# Patient Record
Sex: MALE | Race: WHITE | NOT HISPANIC OR LATINO | URBAN - METROPOLITAN AREA
[De-identification: names, ages, dates, MRNs, and addresses within clinical notes are randomized per-mention and may not be internally consistent; named-entity substitution may affect disease eponyms.]

---

## 2022-05-07 ENCOUNTER — APPOINTMENT (OUTPATIENT)
Dept: RADIOLOGY | Facility: MEDICAL CENTER | Age: 57
End: 2022-05-07
Attending: EMERGENCY MEDICINE
Payer: COMMERCIAL

## 2022-05-07 ENCOUNTER — HOSPITAL ENCOUNTER (EMERGENCY)
Facility: MEDICAL CENTER | Age: 57
End: 2022-05-07
Attending: EMERGENCY MEDICINE
Payer: COMMERCIAL

## 2022-05-07 VITALS
WEIGHT: 250.88 LBS | DIASTOLIC BLOOD PRESSURE: 82 MMHG | OXYGEN SATURATION: 92 % | BODY MASS INDEX: 40.32 KG/M2 | HEART RATE: 75 BPM | SYSTOLIC BLOOD PRESSURE: 159 MMHG | TEMPERATURE: 97.9 F | RESPIRATION RATE: 16 BRPM | HEIGHT: 66 IN

## 2022-05-07 DIAGNOSIS — R07.89 CHEST DISCOMFORT: ICD-10-CM

## 2022-05-07 DIAGNOSIS — M79.89 LEG SWELLING: ICD-10-CM

## 2022-05-07 LAB
ALBUMIN SERPL BCP-MCNC: 4.2 G/DL (ref 3.2–4.9)
ALBUMIN/GLOB SERPL: 1.7 G/DL
ALP SERPL-CCNC: 67 U/L (ref 30–99)
ALT SERPL-CCNC: 41 U/L (ref 2–50)
ANION GAP SERPL CALC-SCNC: 11 MMOL/L (ref 7–16)
AST SERPL-CCNC: 31 U/L (ref 12–45)
BASOPHILS # BLD AUTO: 0.5 % (ref 0–1.8)
BASOPHILS # BLD: 0.04 K/UL (ref 0–0.12)
BILIRUB SERPL-MCNC: 0.5 MG/DL (ref 0.1–1.5)
BUN SERPL-MCNC: 6 MG/DL (ref 8–22)
CALCIUM SERPL-MCNC: 8.7 MG/DL (ref 8.5–10.5)
CHLORIDE SERPL-SCNC: 108 MMOL/L (ref 96–112)
CO2 SERPL-SCNC: 20 MMOL/L (ref 20–33)
CREAT SERPL-MCNC: 0.93 MG/DL (ref 0.5–1.4)
D DIMER PPP IA.FEU-MCNC: 0.29 UG/ML (FEU) (ref 0–0.5)
EKG IMPRESSION: NORMAL
EOSINOPHIL # BLD AUTO: 0.11 K/UL (ref 0–0.51)
EOSINOPHIL NFR BLD: 1.3 % (ref 0–6.9)
ERYTHROCYTE [DISTWIDTH] IN BLOOD BY AUTOMATED COUNT: 47.2 FL (ref 35.9–50)
GFR SERPLBLD CREATININE-BSD FMLA CKD-EPI: 97 ML/MIN/1.73 M 2
GLOBULIN SER CALC-MCNC: 2.5 G/DL (ref 1.9–3.5)
GLUCOSE SERPL-MCNC: 91 MG/DL (ref 65–99)
HCT VFR BLD AUTO: 48.1 % (ref 42–52)
HGB BLD-MCNC: 16.6 G/DL (ref 14–18)
IMM GRANULOCYTES # BLD AUTO: 0.04 K/UL (ref 0–0.11)
IMM GRANULOCYTES NFR BLD AUTO: 0.5 % (ref 0–0.9)
LYMPHOCYTES # BLD AUTO: 2.37 K/UL (ref 1–4.8)
LYMPHOCYTES NFR BLD: 28 % (ref 22–41)
MCH RBC QN AUTO: 33.9 PG (ref 27–33)
MCHC RBC AUTO-ENTMCNC: 34.5 G/DL (ref 33.7–35.3)
MCV RBC AUTO: 98.2 FL (ref 81.4–97.8)
MONOCYTES # BLD AUTO: 0.73 K/UL (ref 0–0.85)
MONOCYTES NFR BLD AUTO: 8.6 % (ref 0–13.4)
NEUTROPHILS # BLD AUTO: 5.18 K/UL (ref 1.82–7.42)
NEUTROPHILS NFR BLD: 61.1 % (ref 44–72)
NRBC # BLD AUTO: 0 K/UL
NRBC BLD-RTO: 0 /100 WBC
NT-PROBNP SERPL IA-MCNC: 58 PG/ML (ref 0–125)
PLATELET # BLD AUTO: 166 K/UL (ref 164–446)
PMV BLD AUTO: 10.2 FL (ref 9–12.9)
POTASSIUM SERPL-SCNC: 4.2 MMOL/L (ref 3.6–5.5)
PROT SERPL-MCNC: 6.7 G/DL (ref 6–8.2)
RBC # BLD AUTO: 4.9 M/UL (ref 4.7–6.1)
SODIUM SERPL-SCNC: 139 MMOL/L (ref 135–145)
TROPONIN T SERPL-MCNC: <6 NG/L (ref 6–19)
WBC # BLD AUTO: 8.5 K/UL (ref 4.8–10.8)

## 2022-05-07 PROCEDURE — 84484 ASSAY OF TROPONIN QUANT: CPT

## 2022-05-07 PROCEDURE — 93005 ELECTROCARDIOGRAM TRACING: CPT | Performed by: EMERGENCY MEDICINE

## 2022-05-07 PROCEDURE — 71045 X-RAY EXAM CHEST 1 VIEW: CPT

## 2022-05-07 PROCEDURE — 99284 EMERGENCY DEPT VISIT MOD MDM: CPT

## 2022-05-07 PROCEDURE — 85379 FIBRIN DEGRADATION QUANT: CPT

## 2022-05-07 PROCEDURE — 80053 COMPREHEN METABOLIC PANEL: CPT

## 2022-05-07 PROCEDURE — 93970 EXTREMITY STUDY: CPT

## 2022-05-07 PROCEDURE — 85025 COMPLETE CBC W/AUTO DIFF WBC: CPT

## 2022-05-07 PROCEDURE — 83880 ASSAY OF NATRIURETIC PEPTIDE: CPT

## 2022-05-07 PROCEDURE — 36415 COLL VENOUS BLD VENIPUNCTURE: CPT

## 2022-05-07 PROCEDURE — 93005 ELECTROCARDIOGRAM TRACING: CPT

## 2022-05-07 RX ORDER — TESTOSTERONE CYPIONATE 200 MG/ML
50 INJECTION, SOLUTION INTRAMUSCULAR
COMMUNITY

## 2022-05-07 NOTE — DISCHARGE INSTRUCTIONS
Please keep your regularly scheduled appointment with your primary care clinic on Monday.  Return to the emergency department if you develop any new or worsening symptoms including worsening leg pain, worsening leg swelling, chest pain, shortness of breath, if your symptoms become more severe and more persistent, or if you have any further concerns.  Return to the emergency department for recheck within 1 to 2 days if your symptoms or not improving and your follow-up appointment on Monday follows through for any reason.

## 2022-05-07 NOTE — ED TRIAGE NOTES
"Robert Paris  57 y.o. male  Chief Complaint   Patient presents with   • Leg Swelling     Bilat, R > L x 4 days   • Palpitations     Pt states, \"not pain, but strange sensations like my heart is pounding or I can feel my pulse in my upper back and wrist.\"   • Lightheadedness     Intermittent x Wed       Patient ambulatory to triage with a steady gait for above complaint.     Patient is alert and oriented, speaking in full sentences, following commands, and responding appropriately to questions. Educated on triage process and instructed patient to alert staff to any changes in condition or worsening symptoms.       BP (!) 169/86   Pulse (!) 57   Temp 36.6 °C (97.8 °F) (Temporal)   Resp 18   Ht 1.676 m (5' 6\")   Wt 114 kg (250 lb 14.1 oz)   SpO2 96%   BMI 40.49 kg/m²       "

## 2022-05-07 NOTE — ED PROVIDER NOTES
ED Physician Note    Chief Complaint:   Lower extremity edema, chest discomfort, lightheadedness    HPI:  Robert Garcia is a very pleasant 57-year-old gentleman who presents to the emergency department for evaluation of lower extremity edema and chest discomfort.  His symptoms began 5 days ago.  He noticed that he had some leg swelling, and drove 7 hours to [facility], he has had persistent bilateral lower extremity edema since that time.  He does seem to improve slightly with elevation, but has not resolved since that time.  He has no associated posterior calf or posterior thigh pain.  He reports no prior history of DVT nor pulmonary embolism, no current anticoagulation, however he is currently on testosterone replacement therapy and has been for about the past 6 weeks.  Additionally, for the past 5 days he has noted some abnormal sensations in the chest though he would not describe this as significant chest pain.  He does intermittently feel lightheaded, he reports no associated abdominal pain, no ongoing thoracic back pain, though at times he does feel as though his symptoms radiate towards his back.  Due to his testosterone replacement therapy he is concerned about the possibility of DVT or pulmonary embolism.  He has a primary care appointment Monday morning, presents to the emergency department today which is Saturday out of concern for possible severe etiology.  He is not had any associated nausea or vomiting, he reports no exertional symptoms, he has no associated shortness of breath.  He has no pain radiating down the back, or towards the legs.  He is unable to identify any reliably exacerbating or alleviating factors.    Review of Systems:  See HPI for pertinent positives and negatives. All other systems negative.    Past Medical History:   has a past medical history of Hypogonadism male and Prolactinoma (HCC).    Social History:  Social History     Tobacco Use   • Smoking status: Never Smoker   •  "Smokeless tobacco: Former User   Substance and Sexual Activity   • Alcohol use: Not Currently     Comment: hx etoh > 15 years ago   • Drug use: Never   • Sexual activity: Not on file       Surgical History:  patient denies any surgical history    Current Medications:  Home Medications     Reviewed by Odette Camarillo R.N. (Registered Nurse) on 05/07/22 at 1048  Med List Status: Partial   Medication Last Dose Status   testosterone cypionate (DEPO-TESTOSTERONE) 200 MG/ML Solution injection  Active                Allergies:  No Known Allergies    Physical Exam:  Vital Signs: BP (!) 169/86   Pulse (!) 58   Temp 36.6 °C (97.8 °F) (Temporal)   Resp 17   Ht 1.676 m (5' 6\")   Wt 114 kg (250 lb 14.1 oz)   SpO2 96%   BMI 40.49 kg/m²   Constitutional: Alert, no acute distress  HENT: Normocephalic, mask in place  Eyes: Pupils equal and reactive, normal conjunctiva  Neck: Supple, normal range of motion, no stridor  Cardiovascular: Extremities are warm and well perfused, no murmur appreciated, normal cardiac auscultation, 2+ radial pulses bilaterally  Pulmonary: No respiratory distress, normal work of breathing, no accessory muscule usage, breath sounds clear and equal bilaterally  Abdomen: Soft, non-distended, non-tender to palpation, no peritoneal signs  Skin: Warm, dry, no rashes or lesions  Musculoskeletal: Normal range of motion in all extremities, no swelling or deformity noted, 2+ pitting edema bilaterally, lower extremity edema symmetric bilaterally  Neurologic: Alert, oriented, normal speech, normal motor function  Psychiatric: Normal and appropriate mood and affect    Medical records reviewed for continuity of care.  No recent visits for similar symptoms, no prior visits to our facility.    EKG: Rate 53, sinus rhythm, no ST elevation or depression, no pathologic T wave inversions, low voltage in 2 and aVF, no ectopy.  No prior EKG available for comparison within our system.      Labs:  Labs Reviewed   CBC WITH " DIFFERENTIAL - Abnormal; Notable for the following components:       Result Value    MCV 98.2 (*)     MCH 33.9 (*)     All other components within normal limits   COMP METABOLIC PANEL - Abnormal; Notable for the following components:    Bun 6 (*)     All other components within normal limits   TROPONIN   ESTIMATED GFR   D-DIMER   PROBRAIN NATRIURETIC PEPTIDE, NT       Radiology:  US-EXTREMITY VENOUS LOWER BILAT         DX-CHEST-PORTABLE (1 VIEW)   Final Result         No acute cardiac or pulmonary abnormality is identified.           ED Medications Administered:  Medications - No data to display    Differential diagnosis:  Electrolyte abnormality, pulmonary embolism, peripheral edema, heart failure, fluid overload    MDM:  Mr. Garcia presents to the emergency department today for evaluation of lower extremity edema and intermittent chest discomfort.  His vital signs are reassuring on arrival, he is mildly hypertensive, he has no hypotension, no tachycardia, and no hypoxia, vital signs are less concerning for severe pulmonary embolism.  He is afebrile without flulike symptoms or fevers, doubt infectious etiology.  He does have bilateral lower extremity edema, fluid overload, new onset heart failure, or peripheral edema remain in my differential.  He does not have any persistent thoracic back pain, he does have equal pulses bilaterally, doubt aortic etiology.    On laboratory evaluation CMP is reassuring without any significant abnormalities.  Hemoglobin is within normal limits, no evidence of symptomatic anemia.  He has a normal white blood count, again less concerning for infectious etiology.  Platelet count is within normal limits.  High-sensitivity troponin ordered in triage according to protocol is negative.  Symptoms have been present for well over 24 hours, do not believe repeat high-sensitivity troponin is necessary.  He did have a recent lengthy car ride, and is currently on testosterone therapy which  slightly increases his risk for DVT or pulmonary embolism.  D-dimer was ordered for further risk stratification.  This resulted negative.  proBNP is within normal limits less concerning for new onset heart failure or fluid overload.    Chest x-ray is negative for acute process.  Mediastinum is normal-appearing.     Bilateral lower extremity ultrasound performed, this demonstrates no evidence of DVT.    At this time, etiology of his symptoms are unclear.  I do not believe he requires any further emergent diagnostics or treatment, he does have a follow-up appointment tomorrow with his primary care physician for complete recheck.  He is encouraged to keep this appointment, and discuss his emergency department visit to see what further diagnostics or testing may be indicated. Return precautions were discussed with the patient, and provided in written form with the patient's discharge instructions.       Personal protective equipment including N95 surgical respirator, goggles, and gloves were used during this encounter.       Disposition:  Discharge home in stable condition    Final Impression:  1. Leg swelling    2. Chest discomfort        Electronically signed by: Jamee Kovacs MD, 5/7/2022 3:01 PM